# Patient Record
Sex: MALE | Race: OTHER
[De-identification: names, ages, dates, MRNs, and addresses within clinical notes are randomized per-mention and may not be internally consistent; named-entity substitution may affect disease eponyms.]

---

## 2018-03-17 ENCOUNTER — HOSPITAL ENCOUNTER (EMERGENCY)
Dept: HOSPITAL 76 - ED | Age: 33
Discharge: HOME | End: 2018-03-17
Payer: COMMERCIAL

## 2018-03-17 VITALS — SYSTOLIC BLOOD PRESSURE: 145 MMHG | DIASTOLIC BLOOD PRESSURE: 94 MMHG

## 2018-03-17 DIAGNOSIS — R07.89: Primary | ICD-10-CM

## 2018-03-17 DIAGNOSIS — F41.9: ICD-10-CM

## 2018-03-17 PROCEDURE — 99284 EMERGENCY DEPT VISIT MOD MDM: CPT

## 2018-03-17 PROCEDURE — 84484 ASSAY OF TROPONIN QUANT: CPT

## 2018-03-17 PROCEDURE — 71046 X-RAY EXAM CHEST 2 VIEWS: CPT

## 2018-03-17 PROCEDURE — 93005 ELECTROCARDIOGRAM TRACING: CPT

## 2018-03-17 PROCEDURE — 36415 COLL VENOUS BLD VENIPUNCTURE: CPT

## 2018-03-17 PROCEDURE — 99283 EMERGENCY DEPT VISIT LOW MDM: CPT

## 2018-03-17 NOTE — XRAY REPORT
EXAM:

CHEST RADIOGRAPHY

 

EXAM DATE: 3/17/2018 10:32 PM.

 

CLINICAL HISTORY: Chest pain.

 

COMPARISON: None.

 

TECHNIQUE: 2 views.

 

FINDINGS: 

Lungs/Pleura: No focal opacities evident. No pleural effusion. No pneumothorax. Normal volumes.

 

Mediastinum: Heart and mediastinal contours are unremarkable.

 

Other: None.

 

IMPRESSION: Normal 2-view chest radiography.

 

[facility]

Referring Provider Line: [phone]

 

SITE ID: [id number]

## 2018-03-17 NOTE — ED PHYSICIAN DOCUMENTATION
PD HPI CHEST PAIN





- Stated complaint


Stated Complaint: CHEST PX





- Chief complaint


Chief Complaint: Cardiac





- History obtained from


History obtained from: Patient, Family





- History of Present Illness


Timing - onset: Today


Timing - onset during: Rest


Timing - details: Gradual onset, Still present


Quality: Pressure, Tightness


Location: Substernal, Left chest, Right chest


Associated symptoms: Shortness of air


Similar symptoms before: Has not had sx before


Recently seen: Not recently seen





- Additional information


Additional information: 





Patient is a 33 year old male with a history of anxiety who is presenting to 

the emergency department for chest pain.  Patient states that it started about 

two hours ago at rest.  patient denies any exertional component to his pain.  

Patient states that he has never had anything like this before, but he has had 

two panic attacks in the past.  patient denies any history of clots, recent 

travel or surgery or estrogen use.  





Review of Systems


Constitutional: denies: Fever, Chills


Eyes: reports: Reviewed and negative


Ears: reports: Reviewed and negative


Nose: denies: Rhinorrhea / runny nose, Congestion


Throat: denies: Sore throat


Cardiac: reports: Chest pain / pressure.  denies: Pedal edema


Respiratory: denies: Cough, Wheezing


GI: denies: Nausea, Vomiting


: reports: Reviewed and negative


Skin: denies: Rash, Lesions


Musculoskeletal: denies: Extremity pain, Extremity swelling


Neurologic: denies: Headache, Head injury


Psychiatric: reports: Anxiety


Immunocompromised: denies: Immunocompromised





PD PAST MEDICAL HISTORY





- Present Medications


Home Medications: 


 Ambulatory Orders











 Medication  Instructions  Recorded  Confirmed


 


Nicotine Polacrilex [Nicorette] 2 mg BC Q4HR #20 gum 03/17/18 














- Allergies


Allergies/Adverse Reactions: 


 Allergies











Allergy/AdvReac Type Severity Reaction Status Date / Time


 


No Known Drug Allergies Allergy   Verified 03/17/18 22:19














PD ED PE NORMAL





- General


General: Alert and oriented X 3, Well developed/nourished





- HEENT


HEENT: Atraumatic, PERRL, Moist mucous membranes





- Cardiac


Cardiac: RRR, No murmur





- Respiratory


Respiratory: No respiratory distress, Clear bilaterally





- Abdomen


Abdomen: Soft, Non tender, Non distended





- Derm


Derm: Normal color, Warm and dry, No rash





- Extremities


Extremities: No deformity, No edema, No calf tenderness / cord





- Neuro


Neuro: Alert and oriented X 3, No motor deficit, No sensory deficit, Normal 

speech


Eye Opening: Spontaneous


Motor: Obeys Commands


Verbal: Oriented


GCS Score: 15





PD ED PE EXPANDED





- General


General: Alert, Anxious





Results





- Vitals


Vitals: 


 Vital Signs - 24 hr











  03/17/18





  22:13


 


Temperature 36.0 C L


 


Heart Rate 71


 


Respiratory 17





Rate 


 


Blood Pressure 145/94 H


 


O2 Saturation 98








 Oxygen











O2 Source                      Room air

















- EKG (time done)


  ** 2215


Rate: Rate (enter#) (67)


Rhythm: NSR


Axis: Normal


Intervals: Normal [place]


QRS: Normal


Compare to prior EKG: Old EKG unavailable





- Labs


Labs: 


 Laboratory Tests











  03/17/18





  22:48


 


Troponin I  < 0.04














- Rads (name of study)


  ** chest x-ray


Radiology: Final report received (normal)





PD MEDICAL DECISION MAKING





- ED course


Complexity details: reviewed old records, reviewed results, re-evaluated patient

, considered differential, d/w patient, d/w family


ED course: 





Patient was seen and examined at bedside.  patient was well appearing and in no 

acute distress.  ekg was performed and was within normal limits.  patient was 

treated with ativan 0.5 mg.  chest x-ray was performed and was negative as well 

as a troponin.  Patient felt a lot better after the ativan.  Patient had a 

HEART score of 1 and PERC score of zero.  A discussion about smoking cessation 

was had with the patient and a prescription for nicorette was written.  Patient 

required no further work up and was stable for discharge with outpatient follow 

up.  





Departure





- Departure


Disposition: 01 Home, Self Care


Clinical Impression: 


 Atypical chest pain





Condition: Good


Instructions:  ED Chest Pain NonCardiac


Follow-Up: 


primary,care provider [Other] - As Needed


Prescriptions: 


Nicotine Polacrilex [Nicorette] 2 mg BC Q4HR #20 gum


Comments: 


Your diagnostics today were within normal limits.  While it is difficult to say 

what exactly is causing your pain it is unlikely cardiac or pulmonary in 

nature.  There is likely a component of anxiety with this pain.  You should 

follow up with your doctor if the symptoms become more frequent.  You may 

return to the emergency department at any time for new, worsening or 

uncontrollable symptoms.